# Patient Record
Sex: FEMALE | Race: WHITE | NOT HISPANIC OR LATINO | Employment: FULL TIME | URBAN - NONMETROPOLITAN AREA
[De-identification: names, ages, dates, MRNs, and addresses within clinical notes are randomized per-mention and may not be internally consistent; named-entity substitution may affect disease eponyms.]

---

## 2023-08-29 PROBLEM — N20.0 LEFT NEPHROLITHIASIS: Status: ACTIVE | Noted: 2023-08-29

## 2023-08-29 PROBLEM — R32 URINARY INCONTINENCE: Status: ACTIVE | Noted: 2023-08-29

## 2023-08-29 RX ORDER — OXYBUTYNIN CHLORIDE 10 MG/1
1 TABLET, EXTENDED RELEASE ORAL DAILY
COMMUNITY
Start: 2019-11-05 | End: 2023-09-18 | Stop reason: SDUPTHER

## 2023-08-30 ENCOUNTER — OFFICE VISIT (OUTPATIENT)
Dept: PRIMARY CARE | Facility: CLINIC | Age: 63
End: 2023-08-30
Payer: COMMERCIAL

## 2023-08-30 ENCOUNTER — LAB (OUTPATIENT)
Dept: LAB | Facility: LAB | Age: 63
End: 2023-08-30
Payer: COMMERCIAL

## 2023-08-30 VITALS — HEART RATE: 81 BPM | OXYGEN SATURATION: 97 % | BODY MASS INDEX: 20.74 KG/M2 | WEIGHT: 124.5 LBS | HEIGHT: 65 IN

## 2023-08-30 DIAGNOSIS — Z00.00 WELLNESS EXAMINATION: ICD-10-CM

## 2023-08-30 DIAGNOSIS — F41.9 ANXIETY: Primary | ICD-10-CM

## 2023-08-30 LAB
ALANINE AMINOTRANSFERASE (SGPT) (U/L) IN SER/PLAS: 14 U/L (ref 7–45)
ALBUMIN (G/DL) IN SER/PLAS: 4.4 G/DL (ref 3.4–5)
ALKALINE PHOSPHATASE (U/L) IN SER/PLAS: 79 U/L (ref 33–136)
ANION GAP IN SER/PLAS: 10 MMOL/L (ref 10–20)
ASPARTATE AMINOTRANSFERASE (SGOT) (U/L) IN SER/PLAS: 15 U/L (ref 9–39)
BILIRUBIN TOTAL (MG/DL) IN SER/PLAS: 0.6 MG/DL (ref 0–1.2)
CALCIUM (MG/DL) IN SER/PLAS: 10.1 MG/DL (ref 8.6–10.3)
CARBON DIOXIDE, TOTAL (MMOL/L) IN SER/PLAS: 30 MMOL/L (ref 21–32)
CHLORIDE (MMOL/L) IN SER/PLAS: 105 MMOL/L (ref 98–107)
CHOLESTEROL (MG/DL) IN SER/PLAS: 201 MG/DL (ref 0–199)
CHOLESTEROL IN HDL (MG/DL) IN SER/PLAS: 66 MG/DL
CHOLESTEROL/HDL RATIO: 3
CREATININE (MG/DL) IN SER/PLAS: 0.8 MG/DL (ref 0.5–1.05)
ERYTHROCYTE DISTRIBUTION WIDTH (RATIO) BY AUTOMATED COUNT: 14.1 % (ref 11.5–14.5)
ERYTHROCYTE MEAN CORPUSCULAR HEMOGLOBIN CONCENTRATION (G/DL) BY AUTOMATED: 31.3 G/DL (ref 32–36)
ERYTHROCYTE MEAN CORPUSCULAR VOLUME (FL) BY AUTOMATED COUNT: 88 FL (ref 80–100)
ERYTHROCYTES (10*6/UL) IN BLOOD BY AUTOMATED COUNT: 5.22 X10E12/L (ref 4–5.2)
GFR FEMALE: 84 ML/MIN/1.73M2
GLUCOSE (MG/DL) IN SER/PLAS: 91 MG/DL (ref 74–99)
HEMATOCRIT (%) IN BLOOD BY AUTOMATED COUNT: 46 % (ref 36–46)
HEMOGLOBIN (G/DL) IN BLOOD: 14.4 G/DL (ref 12–16)
LDL: 118 MG/DL (ref 0–99)
LEUKOCYTES (10*3/UL) IN BLOOD BY AUTOMATED COUNT: 6.5 X10E9/L (ref 4.4–11.3)
PLATELETS (10*3/UL) IN BLOOD AUTOMATED COUNT: 267 X10E9/L (ref 150–450)
POTASSIUM (MMOL/L) IN SER/PLAS: 4.3 MMOL/L (ref 3.5–5.3)
PROTEIN TOTAL: 6.6 G/DL (ref 6.4–8.2)
SODIUM (MMOL/L) IN SER/PLAS: 141 MMOL/L (ref 136–145)
THYROTROPIN (MIU/L) IN SER/PLAS BY DETECTION LIMIT <= 0.05 MIU/L: 1.27 MIU/L (ref 0.44–3.98)
TRIGLYCERIDE (MG/DL) IN SER/PLAS: 83 MG/DL (ref 0–149)
UREA NITROGEN (MG/DL) IN SER/PLAS: 20 MG/DL (ref 6–23)
VLDL: 17 MG/DL (ref 0–40)

## 2023-08-30 PROCEDURE — 84443 ASSAY THYROID STIM HORMONE: CPT

## 2023-08-30 PROCEDURE — 85027 COMPLETE CBC AUTOMATED: CPT

## 2023-08-30 PROCEDURE — 36415 COLL VENOUS BLD VENIPUNCTURE: CPT

## 2023-08-30 PROCEDURE — 99213 OFFICE O/P EST LOW 20 MIN: CPT | Performed by: FAMILY MEDICINE

## 2023-08-30 PROCEDURE — 80061 LIPID PANEL: CPT

## 2023-08-30 PROCEDURE — 80053 COMPREHEN METABOLIC PANEL: CPT

## 2023-08-30 PROCEDURE — 1036F TOBACCO NON-USER: CPT | Performed by: FAMILY MEDICINE

## 2023-08-30 RX ORDER — MELOXICAM 7.5 MG/1
7.5 TABLET ORAL DAILY
COMMUNITY
Start: 2023-04-06 | End: 2023-10-13 | Stop reason: ENTERED-IN-ERROR

## 2023-08-30 NOTE — PROGRESS NOTES
"Subjective   Patient ID: Jolynn Pena is a 63 y.o. female who presents for med check.    HPI here today for a wellness exam laboratory assessment as insurance will be changing.  She reports that she is leaving her job at the [employer] because of the stress.  Since she has made the decision her level of anxiety and dysphoric mood has improved to where she does not feel counseling or medication are necessary, but is to say there is a light at the end of the tunnel.  However there is some concerns over the effect on income to the family.  I reviewed that she is having crying melancholy increased sleep latency early morning awakening trouble with concentration thought rumination.  He is alone meet criterion for diagnosis.  She does not have any suicidal or homicidal ideation, no OCD.    Review of Systems    Objective   Pulse 81   Ht 1.651 m (5' 5\")   Wt 56.5 kg (124 lb 8 oz)   SpO2 97%   BMI 20.72 kg/m²     Physical Exam  Thoughts are appropriate, she has good insight.  Voice has good modulation with appropriate response to questioning.  Assessment/Plan   Problem List Items Addressed This Visit       Anxiety - Primary     Other Visit Diagnoses       Wellness examination        Relevant Orders    CBC (Completed)    Comprehensive Metabolic Panel (Completed)    Lipid Panel (Completed)    Thyroid Stimulating Hormone (Completed)          Follow-up as needed during transition to halfway.     "

## 2023-09-18 DIAGNOSIS — N39.46 MIXED STRESS AND URGE URINARY INCONTINENCE: Primary | ICD-10-CM

## 2023-09-18 RX ORDER — OXYBUTYNIN CHLORIDE 10 MG/1
10 TABLET, EXTENDED RELEASE ORAL DAILY
Qty: 90 TABLET | Refills: 3 | Status: SHIPPED | OUTPATIENT
Start: 2023-09-18 | End: 2024-09-17

## 2023-10-10 ENCOUNTER — APPOINTMENT (OUTPATIENT)
Dept: RADIOLOGY | Facility: HOSPITAL | Age: 63
End: 2023-10-10
Payer: COMMERCIAL

## 2023-10-10 ENCOUNTER — HOSPITAL ENCOUNTER (EMERGENCY)
Facility: HOSPITAL | Age: 63
Discharge: OTHER INSTITUTION | End: 2023-10-11
Attending: EMERGENCY MEDICINE
Payer: COMMERCIAL

## 2023-10-10 DIAGNOSIS — R41.82 ALTERED MENTAL STATUS, UNSPECIFIED ALTERED MENTAL STATUS TYPE: ICD-10-CM

## 2023-10-10 DIAGNOSIS — G93.89 MASS OF BRAIN: ICD-10-CM

## 2023-10-10 DIAGNOSIS — R41.3 AMNESIA: ICD-10-CM

## 2023-10-10 DIAGNOSIS — R11.0 NAUSEA: Primary | ICD-10-CM

## 2023-10-10 LAB
ALBUMIN SERPL BCP-MCNC: 4.2 G/DL (ref 3.4–5)
ALP SERPL-CCNC: 69 U/L (ref 33–136)
ALT SERPL W P-5'-P-CCNC: 16 U/L (ref 7–45)
ANION GAP SERPL CALC-SCNC: 11 MMOL/L (ref 10–20)
AST SERPL W P-5'-P-CCNC: 18 U/L (ref 9–39)
BASOPHILS # BLD AUTO: 0.04 X10*3/UL (ref 0–0.1)
BASOPHILS NFR BLD AUTO: 0.7 %
BILIRUB SERPL-MCNC: 0.3 MG/DL (ref 0–1.2)
BUN SERPL-MCNC: 17 MG/DL (ref 6–23)
CALCIUM SERPL-MCNC: 9.3 MG/DL (ref 8.6–10.3)
CARDIAC TROPONIN I PNL SERPL HS: <3 NG/L (ref 0–13)
CHLORIDE SERPL-SCNC: 105 MMOL/L (ref 98–107)
CO2 SERPL-SCNC: 27 MMOL/L (ref 21–32)
CREAT SERPL-MCNC: 0.71 MG/DL (ref 0.5–1.05)
EOSINOPHIL # BLD AUTO: 0.05 X10*3/UL (ref 0–0.7)
EOSINOPHIL NFR BLD AUTO: 0.9 %
ERYTHROCYTE [DISTWIDTH] IN BLOOD BY AUTOMATED COUNT: 13.7 % (ref 11.5–14.5)
ETHANOL SERPL-MCNC: 105 MG/DL
GFR SERPL CREATININE-BSD FRML MDRD: >90 ML/MIN/1.73M*2
GLUCOSE SERPL-MCNC: 96 MG/DL (ref 74–99)
HCT VFR BLD AUTO: 39.2 % (ref 36–46)
HGB BLD-MCNC: 12.4 G/DL (ref 12–16)
IMM GRANULOCYTES # BLD AUTO: 0.07 X10*3/UL (ref 0–0.7)
IMM GRANULOCYTES NFR BLD AUTO: 1.2 % (ref 0–0.9)
LYMPHOCYTES # BLD AUTO: 1.33 X10*3/UL (ref 1.2–4.8)
LYMPHOCYTES NFR BLD AUTO: 23.5 %
MAGNESIUM SERPL-MCNC: 1.95 MG/DL (ref 1.6–2.4)
MCH RBC QN AUTO: 27.5 PG (ref 26–34)
MCHC RBC AUTO-ENTMCNC: 31.6 G/DL (ref 32–36)
MCV RBC AUTO: 87 FL (ref 80–100)
MONOCYTES # BLD AUTO: 0.61 X10*3/UL (ref 0.1–1)
MONOCYTES NFR BLD AUTO: 10.8 %
NEUTROPHILS # BLD AUTO: 3.55 X10*3/UL (ref 1.2–7.7)
NEUTROPHILS NFR BLD AUTO: 62.9 %
NRBC BLD-RTO: 0 /100 WBCS (ref 0–0)
PLATELET # BLD AUTO: 247 X10*3/UL (ref 150–450)
PMV BLD AUTO: 11.4 FL (ref 7.5–11.5)
POTASSIUM SERPL-SCNC: 3.6 MMOL/L (ref 3.5–5.3)
PROT SERPL-MCNC: 6.6 G/DL (ref 6.4–8.2)
RBC # BLD AUTO: 4.51 X10*6/UL (ref 4–5.2)
SODIUM SERPL-SCNC: 139 MMOL/L (ref 136–145)
WBC # BLD AUTO: 5.7 X10*3/UL (ref 4.4–11.3)

## 2023-10-10 PROCEDURE — 82077 ASSAY SPEC XCP UR&BREATH IA: CPT | Performed by: PHYSICIAN ASSISTANT

## 2023-10-10 PROCEDURE — 80053 COMPREHEN METABOLIC PANEL: CPT | Performed by: PHYSICIAN ASSISTANT

## 2023-10-10 PROCEDURE — 2500000004 HC RX 250 GENERAL PHARMACY W/ HCPCS (ALT 636 FOR OP/ED): Performed by: PHYSICIAN ASSISTANT

## 2023-10-10 PROCEDURE — 85610 PROTHROMBIN TIME: CPT | Performed by: PHYSICIAN ASSISTANT

## 2023-10-10 PROCEDURE — 70450 CT HEAD/BRAIN W/O DYE: CPT | Performed by: STUDENT IN AN ORGANIZED HEALTH CARE EDUCATION/TRAINING PROGRAM

## 2023-10-10 PROCEDURE — 70450 CT HEAD/BRAIN W/O DYE: CPT | Mod: 59

## 2023-10-10 PROCEDURE — 36415 COLL VENOUS BLD VENIPUNCTURE: CPT | Performed by: PHYSICIAN ASSISTANT

## 2023-10-10 PROCEDURE — 87186 SC STD MICRODIL/AGAR DIL: CPT | Mod: CMCLAB,SAMLAB | Performed by: PHYSICIAN ASSISTANT

## 2023-10-10 PROCEDURE — 87086 URINE CULTURE/COLONY COUNT: CPT | Mod: SAMLAB | Performed by: PHYSICIAN ASSISTANT

## 2023-10-10 PROCEDURE — 96374 THER/PROPH/DIAG INJ IV PUSH: CPT | Mod: XU

## 2023-10-10 PROCEDURE — 70498 CT ANGIOGRAPHY NECK: CPT | Performed by: STUDENT IN AN ORGANIZED HEALTH CARE EDUCATION/TRAINING PROGRAM

## 2023-10-10 PROCEDURE — 71045 X-RAY EXAM CHEST 1 VIEW: CPT | Mod: FOREIGN READ | Performed by: RADIOLOGY

## 2023-10-10 PROCEDURE — 85025 COMPLETE CBC W/AUTO DIFF WBC: CPT | Performed by: PHYSICIAN ASSISTANT

## 2023-10-10 PROCEDURE — 2550000001 HC RX 255 CONTRASTS: Performed by: EMERGENCY MEDICINE

## 2023-10-10 PROCEDURE — 85730 THROMBOPLASTIN TIME PARTIAL: CPT | Performed by: PHYSICIAN ASSISTANT

## 2023-10-10 PROCEDURE — 70496 CT ANGIOGRAPHY HEAD: CPT

## 2023-10-10 PROCEDURE — 70496 CT ANGIOGRAPHY HEAD: CPT | Performed by: STUDENT IN AN ORGANIZED HEALTH CARE EDUCATION/TRAINING PROGRAM

## 2023-10-10 PROCEDURE — 81001 URINALYSIS AUTO W/SCOPE: CPT | Performed by: PHYSICIAN ASSISTANT

## 2023-10-10 PROCEDURE — 93005 ELECTROCARDIOGRAM TRACING: CPT

## 2023-10-10 PROCEDURE — 71045 X-RAY EXAM CHEST 1 VIEW: CPT | Mod: FR

## 2023-10-10 PROCEDURE — 99285 EMERGENCY DEPT VISIT HI MDM: CPT | Mod: 25

## 2023-10-10 PROCEDURE — 70498 CT ANGIOGRAPHY NECK: CPT

## 2023-10-10 PROCEDURE — 99284 EMERGENCY DEPT VISIT MOD MDM: CPT | Mod: 25

## 2023-10-10 PROCEDURE — 83735 ASSAY OF MAGNESIUM: CPT | Performed by: PHYSICIAN ASSISTANT

## 2023-10-10 PROCEDURE — 84484 ASSAY OF TROPONIN QUANT: CPT | Performed by: PHYSICIAN ASSISTANT

## 2023-10-10 RX ORDER — LORAZEPAM 2 MG/ML
1 INJECTION INTRAMUSCULAR ONCE
Status: COMPLETED | OUTPATIENT
Start: 2023-10-10 | End: 2023-10-10

## 2023-10-10 RX ADMIN — LORAZEPAM 1 MG: 2 INJECTION INTRAMUSCULAR; INTRAVENOUS at 19:17

## 2023-10-10 RX ADMIN — IOHEXOL 85 ML: 350 INJECTION, SOLUTION INTRAVENOUS at 20:31

## 2023-10-10 ASSESSMENT — ENCOUNTER SYMPTOMS
PALPITATIONS: 0
SHORTNESS OF BREATH: 0
ARTHRALGIAS: 0
HEMATURIA: 0
EYE PAIN: 0
BACK PAIN: 0
VOMITING: 0
CHILLS: 0
NERVOUS/ANXIOUS: 1
COLOR CHANGE: 0
ABDOMINAL PAIN: 0
FEVER: 0
SEIZURES: 0
DYSURIA: 0
SORE THROAT: 0
COUGH: 0

## 2023-10-10 ASSESSMENT — COLUMBIA-SUICIDE SEVERITY RATING SCALE - C-SSRS
6. HAVE YOU EVER DONE ANYTHING, STARTED TO DO ANYTHING, OR PREPARED TO DO ANYTHING TO END YOUR LIFE?: NO
1. IN THE PAST MONTH, HAVE YOU WISHED YOU WERE DEAD OR WISHED YOU COULD GO TO SLEEP AND NOT WAKE UP?: NO
2. HAVE YOU ACTUALLY HAD ANY THOUGHTS OF KILLING YOURSELF?: NO

## 2023-10-10 ASSESSMENT — PAIN - FUNCTIONAL ASSESSMENT: PAIN_FUNCTIONAL_ASSESSMENT: 0-10

## 2023-10-10 NOTE — ED PROVIDER NOTES
Patient is a 63-year-old female who presents to the emergency department with a chief complaint of nausea and feeling anxious.  Patient reports that today was the last day of her job.  She is retiring.  She states that she was feeling anxious prior to leading up to this.  She states that she saw her family physician who recently prescribed her hydroxyzine.  She took a dose on Saturday and also took a dose today.  Patient reports that after work she went out with some coworkers to have an alcoholic beverage.  She reports that after having a sip of the alcohol she began feeling odd.  It was reported by the patient's coworkers that she started breathing fast, could not stand up, could not speak.  She was not sure if she was having a reaction to her medication.  Patient does report that she feels slightly anxious.  She is tearful.      History provided by:  Relative       Review of Systems   Constitutional:  Negative for chills and fever.   HENT:  Negative for ear pain and sore throat.    Eyes:  Negative for pain and visual disturbance.   Respiratory:  Negative for cough and shortness of breath.    Cardiovascular:  Negative for chest pain and palpitations.   Gastrointestinal:  Negative for abdominal pain and vomiting.   Genitourinary:  Negative for dysuria and hematuria.   Musculoskeletal:  Negative for arthralgias and back pain.   Skin:  Negative for color change and rash.   Neurological:  Negative for seizures and syncope.   Psychiatric/Behavioral:  Positive for suicidal ideas. The patient is nervous/anxious.    All other systems reviewed and are negative.       Physical Exam  Vitals and nursing note reviewed.   Constitutional:       General: She is not in acute distress.     Appearance: She is well-developed.   HENT:      Head: Normocephalic and atraumatic.   Eyes:      Conjunctiva/sclera: Conjunctivae normal.   Cardiovascular:      Rate and Rhythm: Normal rate and regular rhythm.      Heart sounds: No murmur  heard.  Pulmonary:      Effort: Pulmonary effort is normal. No respiratory distress.      Breath sounds: Normal breath sounds.   Abdominal:      Palpations: Abdomen is soft.      Tenderness: There is no abdominal tenderness.   Musculoskeletal:         General: No swelling.      Cervical back: Neck supple.   Skin:     General: Skin is warm and dry.      Capillary Refill: Capillary refill takes less than 2 seconds.   Neurological:      General: No focal deficit present.      Mental Status: She is alert and oriented to person, place, and time.      Cranial Nerves: No cranial nerve deficit.      Sensory: No sensory deficit.      Motor: No weakness.      Coordination: Coordination normal.      Gait: Gait normal.   Psychiatric:         Mood and Affect: Mood is anxious.      Comments: Tearful          Labs Reviewed   CBC WITH AUTO DIFFERENTIAL - Abnormal       Result Value    WBC 5.7      nRBC 0.0      RBC 4.51      Hemoglobin 12.4      Hematocrit 39.2      MCV 87      MCH 27.5      MCHC 31.6 (*)     RDW 13.7      Platelets 247      MPV 11.4      Neutrophils % 62.9      Immature Granulocytes %, Automated 1.2 (*)     Lymphocytes % 23.5      Monocytes % 10.8      Eosinophils % 0.9      Basophils % 0.7      Neutrophils Absolute 3.55      Immature Granulocytes Absolute, Automated 0.07      Lymphocytes Absolute 1.33      Monocytes Absolute 0.61      Eosinophils Absolute 0.05      Basophils Absolute 0.04     PROTIME-INR - Abnormal    Protime 13.2 (*)     INR 1.2 (*)    URINALYSIS WITH REFLEX MICROSCOPIC AND CULTURE - Abnormal    Color, Urine Yellow      Appearance, Urine Clear      Specific Gravity, Urine 1.013      pH, Urine 6.0      Protein, Urine NEGATIVE      Glucose, Urine NEGATIVE      Blood, Urine NEGATIVE      Ketones, Urine NEGATIVE      Bilirubin, Urine NEGATIVE      Urobilinogen, Urine <2.0      Nitrite, Urine POSITIVE (*)     Leukocyte Esterase, Urine LARGE (3+) (*)    ALCOHOL - Abnormal    Alcohol 105 (*)     URINALYSIS MICROSCOPIC ONLY - Abnormal    WBC, Urine 6-10 (*)     RBC, Urine 1-2      Mucus, Urine 1+     COMPREHENSIVE METABOLIC PANEL - Normal    Glucose 96      Sodium 139      Potassium 3.6      Chloride 105      Bicarbonate 27      Anion Gap 11      Urea Nitrogen 17      Creatinine 0.71      eGFR >90      Calcium 9.3      Albumin 4.2      Alkaline Phosphatase 69      Total Protein 6.6      AST 18      Bilirubin, Total 0.3      ALT 16     MAGNESIUM - Normal    Magnesium 1.95     APTT - Normal    aPTT 29      Narrative:     The APTT is no longer used for monitoring Unfractionated Heparin Therapy. For monitoring Heparin Therapy, use the Heparin Assay.   SERIAL TROPONIN-INITIAL - Normal    Troponin I, High Sensitivity <3      Narrative:     Less than 99th percentile of normal range cutoff-  Female and children under 18 years old <14 ng/L; Male <21 ng/L: Negative  Repeat testing should be performed if clinically indicated.     Female and children under 18 years old 14-50 ng/L; Male 21-50 ng/L:  Consistent with possible cardiac damage and possible increased clinical   risk. Serial measurements may help to assess extent of myocardial damage.     >50 ng/L: Consistent with cardiac damage, increased clinical risk and  myocardial infarction. Serial measurements may help assess extent of   myocardial damage.      NOTE: Children less than 1 year old may have higher baseline troponin   levels and results should be interpreted in conjunction with the overall   clinical context.     NOTE: Troponin I testing is performed using a different   testing methodology at [facility] than at other   [facility] hospitals. Direct result comparisons should only   be made within the same method.   URINE CULTURE   EXTRA URINE GRAY TUBE    Extra Tube Hold for add-ons.     TROPONIN SERIES- (INITIAL, 1 HR)    Narrative:     The following orders were created for panel order Troponin I Series, High Sensitivity (0, 1 HR).  Procedure                                Abnormality         Status                     ---------                               -----------         ------                     Troponin I, High Sensiti...[[id number]]  Normal              Final result               Troponin, High Sensitivi...[[id number]]                                                   Please view results for these tests on the individual orders.   URINALYSIS WITH REFLEX MICROSCOPIC AND CULTURE    Narrative:     The following orders were created for panel order Urinalysis with Reflex Microscopic and Culture.  Procedure                               Abnormality         Status                     ---------                               -----------         ------                     Urinalysis with Reflex M...[[id number]]  Abnormal            Final result               Extra Urine Gray Tube[[id number]]                            Preliminary result           Please view results for these tests on the individual orders.   SERIAL TROPONIN, 1 HOUR        CT angio brain attack neck w IV contrast and post procedure   Final Result   No evidence for significant stenosis or large branch vessel cutoffs   of the intracranial or cervical vessels. Tortuosity of the bilateral   cervical internal carotid and vertebral arteries without wall   irregularity is nonspecific and may represent mild fibromuscular   dysplasia. No evidence for aneurysm.        There is an enhancing presumed extra-axial mass within the right   anterior cranial fossa without apparent dural tail or hyperostosis.   This is favored to represent a benign meningioma, however   nonspecific. Further characterization with nonemergent contrast   enhanced brain MRI is recommended.        MACRO:   None        Signed by: Roderick Simmons 10/10/2023 9:38 PM   Dictation workstation:   [id number]      CT angio brain attack head w IV contrast and post procedure   Final Result   No evidence for significant stenosis or large branch  vessel cutoffs   of the intracranial or cervical vessels. Tortuosity of the bilateral   cervical internal carotid and vertebral arteries without wall   irregularity is nonspecific and may represent mild fibromuscular   dysplasia. No evidence for aneurysm.        There is an enhancing presumed extra-axial mass within the right   anterior cranial fossa without apparent dural tail or hyperostosis.   This is favored to represent a benign meningioma, however   nonspecific. Further characterization with nonemergent contrast   enhanced brain MRI is recommended.        MACRO:   None        Signed by: Roderick Simmons 10/10/2023 9:38 PM   Dictation workstation:   [id number]      CT head wo IV contrast   Final Result   1. 2.7 x 1.8 x 1.7 cm hyperdense mass in the region of the right   anterior perforated substance. There may be minimal surrounding   vasogenic edema though less than expected for an acute hematoma and   its density may reflect a subacute or late acute hematoma.   Differential diagnosis also includes a large aneurysm projecting   superiorly or less likely neoplasm. Recommend CT angiography of the   head at this time for further evaluation.                       MACRO:   Carloz Araujo discussed the significance and urgency of this   critical finding by telephone with  IRIS FREITAS on 10/10/2023   at 8:14 pm.  (**-RCF-**) Findings:  See findings.        Signed by: Carloz Araujo 10/10/2023 8:16 PM   Dictation workstation:   [id number]      XR chest 1 view   Final Result   No acute pulmonary disease   Signed by Tray Albarran MD           Procedures     Medical Decision Making  Patient is a 63-year-old female who presents to the emergency department with some vague complaints.  She originally presented with nausea and feeling off.  When I evaluated the patient she was extremely tearful reported that she felt extremely anxious.  She stated that she felt off but was unable to further elaborate.  Patient's  daughter did show up later after the patient was triaged and it was reported that patient had gone out for an alcoholic beverage with some coworkers to celebrate her MCC.  Patient reports that she did have a very small amount of alcohol.  The patient's daughter reports that it was reported that the patient at 1 point was acting inappropriately.  She reported she could not walk.  Her head was slumped.  She was breathing and fast and hyperventilating.  CT scan of the head was obtained which showed a mass which there was a concern for hematoma or any aneurysm.  Patient was immediately taken to CT angio of her head and neck, for further evaluation.  CT scan of the head and neck showed findings likely consistent with a benign meningioma with no evidence of significant stenosis or large branch vessel cutoffs.  Discussed in depth with the patient and her family throughout this entire course.  They requested transfer to [facility].  Patient was excepted by Dr. Weston to [facility] emergency department.  Transportation via squad is approximately 2 hours out.  Patient's NIH continues to remain a 0.  She was scored a 1 during second evaluation in which she had some mildly slurred speech which has resolved.  She did also have some initial gait abnormalities when she ambulated to the bathroom.  She still is having some difficulty ambulating and needs ambulating shin with assistance but it is significantly improved.  Patient is having issues with memory.  She seems to be very forgetful about recent events.  Patient remains hemodynamically stable.  Patient care will be transitioned to attending physician, Dr. Jordan Otero at 2333.    Amount and/or Complexity of Data Reviewed  External Data Reviewed: labs.  Labs: ordered. Decision-making details documented in ED Course.  Radiology: ordered. Decision-making details documented in ED Course.  ECG/medicine tests: ordered and independent interpretation performed.      Details: EKG shows normal sinus rhythm with a rate of 86 bpm.  No ST elevation.  Peer intervals 132 ms and QRS duration is 90 ms.  EKG interpretation per myself, Marleny Arango    Risk  Decision regarding hospitalization.    The patient was seen by the CORBIN.  I have personally performed a substantive portion of the encounter.  I have seen and examined the patient; agree with the workup, evaluation, MDM, management and diagnosis.  The care plan has been discussed with the CORBIN.  I have reviewed the CORBIN's note and agree with the documented findings.  Jordan Clark DO     ED Course as of 10/11/23 0351   Tue Oct 10, 2023   1949 CT head wo IV contrast [KM]   1950 Reviewed CT scan of the head, awaiting image read. There is concern for either a brain bleed or brain mass [KM]   2009 Called Rad ops. Asked about status of CT scan of the brain. Jaycob from Rad ops said no radiologist has picked up the case-he will send it out again. [KM]   2017 I spoke with radiologist who states that there is a hyperattenuated mass and a CT angio is recommended for further assesment [KM]   2140 I spoke with [facility]. Updated them on CTA results. Results faxed to the transfer center. Family updated [KM]   2224 Patient accepted to [facility] ED. Accepted by Dr. Weston [KM]      ED Course User Index  [KM] Marleny Arango PA-C         Diagnoses as of 10/11/23 0351   Nausea   Amnesia   Mass of brain   Altered mental status, unspecified altered mental status type                    Marleny Arango PA-C  10/10/23 9993       Jordan Clark DO  10/11/23 0351

## 2023-10-11 VITALS
DIASTOLIC BLOOD PRESSURE: 79 MMHG | RESPIRATION RATE: 18 BRPM | WEIGHT: 125 LBS | TEMPERATURE: 97.8 F | HEART RATE: 79 BPM | SYSTOLIC BLOOD PRESSURE: 117 MMHG | BODY MASS INDEX: 21.34 KG/M2 | OXYGEN SATURATION: 98 % | HEIGHT: 64 IN

## 2023-10-11 LAB
APPEARANCE UR: CLEAR
APTT PPP: 29 SECONDS (ref 27–38)
BILIRUB UR STRIP.AUTO-MCNC: NEGATIVE MG/DL
COLOR UR: YELLOW
GLUCOSE UR STRIP.AUTO-MCNC: NEGATIVE MG/DL
INR PPP: 1.2 (ref 0.9–1.1)
KETONES UR STRIP.AUTO-MCNC: NEGATIVE MG/DL
LEUKOCYTE ESTERASE UR QL STRIP.AUTO: ABNORMAL
MUCOUS THREADS #/AREA URNS AUTO: ABNORMAL /LPF
NITRITE UR QL STRIP.AUTO: POSITIVE
PH UR STRIP.AUTO: 6 [PH]
PROT UR STRIP.AUTO-MCNC: NEGATIVE MG/DL
PROTHROMBIN TIME: 13.2 SECONDS (ref 9.8–12.8)
RBC # UR STRIP.AUTO: NEGATIVE /UL
RBC #/AREA URNS AUTO: ABNORMAL /HPF
SP GR UR STRIP.AUTO: 1.01
UROBILINOGEN UR STRIP.AUTO-MCNC: <2 MG/DL
WBC #/AREA URNS AUTO: ABNORMAL /HPF

## 2023-10-12 ENCOUNTER — TELEPHONE (OUTPATIENT)
Dept: PRIMARY CARE | Facility: CLINIC | Age: 63
End: 2023-10-12
Payer: COMMERCIAL

## 2023-10-12 DIAGNOSIS — Z71.89 BRAIN TUMOR CONSULTATION: ICD-10-CM

## 2023-10-12 NOTE — TELEPHONE ENCOUNTER
TOLD TO CALL FOR A REFERRAL TO  DR TRESA PARKER   NEUROLOGIST. [facility] . BRAIN TUMOR   [phone]   TY  DAUGHTER    REFERRAL  ENTERED

## 2023-10-13 ENCOUNTER — HOSPITAL ENCOUNTER (EMERGENCY)
Facility: HOSPITAL | Age: 63
Discharge: HOME | End: 2023-10-13
Attending: EMERGENCY MEDICINE
Payer: COMMERCIAL

## 2023-10-13 ENCOUNTER — APPOINTMENT (OUTPATIENT)
Dept: RADIOLOGY | Facility: HOSPITAL | Age: 63
End: 2023-10-13
Payer: COMMERCIAL

## 2023-10-13 ENCOUNTER — HOSPITAL ENCOUNTER (EMERGENCY)
Facility: HOSPITAL | Age: 63
Discharge: STILL ADMITTED OR ADMINISTRATIVE | End: 2023-10-13
Attending: EMERGENCY MEDICINE
Payer: COMMERCIAL

## 2023-10-13 VITALS
HEIGHT: 65 IN | SYSTOLIC BLOOD PRESSURE: 115 MMHG | WEIGHT: 125 LBS | DIASTOLIC BLOOD PRESSURE: 76 MMHG | TEMPERATURE: 97.1 F | RESPIRATION RATE: 17 BRPM | BODY MASS INDEX: 20.83 KG/M2 | HEART RATE: 83 BPM | OXYGEN SATURATION: 100 %

## 2023-10-13 DIAGNOSIS — R20.2 LEFT LEG PARESTHESIAS: Primary | ICD-10-CM

## 2023-10-13 DIAGNOSIS — D32.9 MENINGIOMA (MULTI): Primary | ICD-10-CM

## 2023-10-13 LAB
ANION GAP SERPL CALC-SCNC: 10 MMOL/L (ref 10–20)
BACTERIA UR CULT: ABNORMAL
BASOPHILS # BLD AUTO: 0.05 X10*3/UL (ref 0–0.1)
BASOPHILS NFR BLD AUTO: 0.8 %
BUN SERPL-MCNC: 21 MG/DL (ref 6–23)
CALCIUM SERPL-MCNC: 9.4 MG/DL (ref 8.6–10.3)
CARDIAC TROPONIN I PNL SERPL HS: 3 NG/L (ref 0–13)
CARDIAC TROPONIN I PNL SERPL HS: <3 NG/L (ref 0–13)
CHLORIDE SERPL-SCNC: 106 MMOL/L (ref 98–107)
CO2 SERPL-SCNC: 27 MMOL/L (ref 21–32)
CREAT SERPL-MCNC: 0.71 MG/DL (ref 0.5–1.05)
EOSINOPHIL # BLD AUTO: 0.11 X10*3/UL (ref 0–0.7)
EOSINOPHIL NFR BLD AUTO: 1.7 %
ERYTHROCYTE [DISTWIDTH] IN BLOOD BY AUTOMATED COUNT: 13.8 % (ref 11.5–14.5)
GFR SERPL CREATININE-BSD FRML MDRD: >90 ML/MIN/1.73M*2
GLUCOSE SERPL-MCNC: 89 MG/DL (ref 74–99)
HCT VFR BLD AUTO: 42.2 % (ref 36–46)
HGB BLD-MCNC: 13.5 G/DL (ref 12–16)
IMM GRANULOCYTES # BLD AUTO: 0.02 X10*3/UL (ref 0–0.7)
IMM GRANULOCYTES NFR BLD AUTO: 0.3 % (ref 0–0.9)
LYMPHOCYTES # BLD AUTO: 1.42 X10*3/UL (ref 1.2–4.8)
LYMPHOCYTES NFR BLD AUTO: 22.5 %
MCH RBC QN AUTO: 27.8 PG (ref 26–34)
MCHC RBC AUTO-ENTMCNC: 32 G/DL (ref 32–36)
MCV RBC AUTO: 87 FL (ref 80–100)
MONOCYTES # BLD AUTO: 0.67 X10*3/UL (ref 0.1–1)
MONOCYTES NFR BLD AUTO: 10.6 %
NEUTROPHILS # BLD AUTO: 4.05 X10*3/UL (ref 1.2–7.7)
NEUTROPHILS NFR BLD AUTO: 64.1 %
NRBC BLD-RTO: 0 /100 WBCS (ref 0–0)
PLATELET # BLD AUTO: 267 X10*3/UL (ref 150–450)
PMV BLD AUTO: 10.8 FL (ref 7.5–11.5)
POTASSIUM SERPL-SCNC: 4.2 MMOL/L (ref 3.5–5.3)
RBC # BLD AUTO: 4.86 X10*6/UL (ref 4–5.2)
SODIUM SERPL-SCNC: 139 MMOL/L (ref 136–145)
WBC # BLD AUTO: 6.3 X10*3/UL (ref 4.4–11.3)

## 2023-10-13 PROCEDURE — 99281 EMR DPT VST MAYX REQ PHY/QHP: CPT | Performed by: EMERGENCY MEDICINE

## 2023-10-13 PROCEDURE — 36415 COLL VENOUS BLD VENIPUNCTURE: CPT | Performed by: EMERGENCY MEDICINE

## 2023-10-13 PROCEDURE — 85025 COMPLETE CBC W/AUTO DIFF WBC: CPT | Performed by: EMERGENCY MEDICINE

## 2023-10-13 PROCEDURE — 70450 CT HEAD/BRAIN W/O DYE: CPT

## 2023-10-13 PROCEDURE — 84484 ASSAY OF TROPONIN QUANT: CPT | Performed by: EMERGENCY MEDICINE

## 2023-10-13 PROCEDURE — 4500999001 HC ED NO CHARGE

## 2023-10-13 PROCEDURE — 2500000004 HC RX 250 GENERAL PHARMACY W/ HCPCS (ALT 636 FOR OP/ED): Performed by: EMERGENCY MEDICINE

## 2023-10-13 PROCEDURE — 96374 THER/PROPH/DIAG INJ IV PUSH: CPT

## 2023-10-13 PROCEDURE — 93005 ELECTROCARDIOGRAM TRACING: CPT

## 2023-10-13 PROCEDURE — 80048 BASIC METABOLIC PNL TOTAL CA: CPT | Performed by: EMERGENCY MEDICINE

## 2023-10-13 PROCEDURE — 71046 X-RAY EXAM CHEST 2 VIEWS: CPT

## 2023-10-13 PROCEDURE — 99284 EMERGENCY DEPT VISIT MOD MDM: CPT | Mod: 25

## 2023-10-13 PROCEDURE — 71046 X-RAY EXAM CHEST 2 VIEWS: CPT | Performed by: RADIOLOGY

## 2023-10-13 PROCEDURE — 70450 CT HEAD/BRAIN W/O DYE: CPT | Performed by: RADIOLOGY

## 2023-10-13 RX ORDER — LORAZEPAM 2 MG/ML
0.5 INJECTION INTRAMUSCULAR ONCE
Status: COMPLETED | OUTPATIENT
Start: 2023-10-13 | End: 2023-10-13

## 2023-10-13 RX ORDER — HYDROXYZINE PAMOATE 25 MG/1
1 CAPSULE ORAL 4 TIMES DAILY PRN
COMMUNITY
Start: 2023-10-07 | End: 2024-04-23 | Stop reason: ALTCHOICE

## 2023-10-13 RX ORDER — MELOXICAM 15 MG/1
15 TABLET ORAL DAILY
COMMUNITY

## 2023-10-13 RX ADMIN — LORAZEPAM 0.5 MG: 2 INJECTION INTRAMUSCULAR; INTRAVENOUS at 14:43

## 2023-10-13 ASSESSMENT — PAIN - FUNCTIONAL ASSESSMENT
PAIN_FUNCTIONAL_ASSESSMENT: 0-10
PAIN_FUNCTIONAL_ASSESSMENT: 0-10

## 2023-10-13 ASSESSMENT — PAIN DESCRIPTION - LOCATION
LOCATION: EYE
LOCATION: EYE

## 2023-10-13 ASSESSMENT — COLUMBIA-SUICIDE SEVERITY RATING SCALE - C-SSRS
2. HAVE YOU ACTUALLY HAD ANY THOUGHTS OF KILLING YOURSELF?: NO
6. HAVE YOU EVER DONE ANYTHING, STARTED TO DO ANYTHING, OR PREPARED TO DO ANYTHING TO END YOUR LIFE?: NO
1. IN THE PAST MONTH, HAVE YOU WISHED YOU WERE DEAD OR WISHED YOU COULD GO TO SLEEP AND NOT WAKE UP?: NO

## 2023-10-13 ASSESSMENT — PAIN SCALES - GENERAL
PAINLEVEL_OUTOF10: 2
PAINLEVEL_OUTOF10: 2

## 2023-10-13 ASSESSMENT — PAIN DESCRIPTION - ORIENTATION
ORIENTATION: LEFT
ORIENTATION: LEFT

## 2023-10-13 ASSESSMENT — PAIN DESCRIPTION - PAIN TYPE
TYPE: ACUTE PAIN
TYPE: ACUTE PAIN

## 2023-10-13 NOTE — ED PROVIDER NOTES
"HPI   Chief Complaint   Patient presents with   • Extremity Weakness     Patient states she was seen here two days ago for slurred speech and altered mental, was found to have a tumor in her brain and was transferred to [place]. Daughter states today around 1:30 patient complained of left eye pain, they felt she had a left facial droop and patient states her left foot felt numb, symptoms seem to have all resolved. Dr Ferrer at bedside and does not feel a stroke alert should be called.       Patient presents to the emergency department secondary to concern for left-sided facial droop and tingling in her left foot.  The patient upon further history was recently diagnosed with a meningioma and was initially treated at our facility and then transferred to [facility] in [place].  She was subsequently discharged home and has scheduled an appointment with a [facility] neurologist for a \"second opinion\".  Today while having lunch with her friend apparently she had some tingling in her left foot and stated that she had some tingling around her left eye and questionable left facial droop.  Presents now to be further evaluated.      History provided by:  Patient and relative   used: No                        Ubaldo Coma Scale Score: 15         NIH Stroke Scale: 0          Patient History   Past Medical History:   Diagnosis Date   • History of meningioma      Past Surgical History:   Procedure Laterality Date   • CARPAL TUNNEL RELEASE Left      No family history on file.  Social History     Tobacco Use   • Smoking status: Never   • Smokeless tobacco: Never   Vaping Use   • Vaping Use: Never used   Substance Use Topics   • Alcohol use: Yes     Comment: occasional   • Drug use: Defer       Physical Exam   ED Triage Vitals [10/13/23 1400]   Temp Heart Rate Resp BP   36.2 °C (97.1 °F) 93 17 (!) 124/91      SpO2 Temp Source Heart Rate Source Patient Position   98 % Temporal Monitor --    "   BP Location FiO2 (%)     -- --       Physical Exam  Vitals and nursing note reviewed.   Constitutional:       General: She is not in acute distress.     Appearance: Normal appearance. She is normal weight. She is not ill-appearing, toxic-appearing or diaphoretic.      Comments: Patient is sitting up in bed tearful and crying.  She is not confused and she provides a full history.  She is not dysarthric.  There is no obvious facial droop while standing at the bedside.   HENT:      Head: Normocephalic and atraumatic.      Nose: Nose normal. No rhinorrhea.   Eyes:      Extraocular Movements: Extraocular movements intact.      Conjunctiva/sclera: Conjunctivae normal.      Pupils: Pupils are equal, round, and reactive to light.   Neck:      Comments: Trachea is midline  Cardiovascular:      Rate and Rhythm: Normal rate and regular rhythm.      Heart sounds: No murmur heard.  Pulmonary:      Effort: Pulmonary effort is normal.      Breath sounds: Normal breath sounds. No wheezing.   Abdominal:      General: Abdomen is flat. Bowel sounds are normal. There is no distension.      Palpations: Abdomen is soft.      Tenderness: There is no abdominal tenderness.   Musculoskeletal:         General: No swelling, tenderness, deformity or signs of injury. Normal range of motion.      Cervical back: Normal range of motion.      Right lower leg: No edema.      Left lower leg: No edema.      Comments: Patient has 5/5 muscle strength testing in all 4 extremities.   strengths are equal.  Patient can dorsiflex and plantarflex equally without deficit.   Skin:     General: Skin is warm and dry.      Findings: No rash.   Neurological:      General: No focal deficit present.      Mental Status: She is alert and oriented to person, place, and time. Mental status is at baseline.      Cranial Nerves: No cranial nerve deficit.      Sensory: No sensory deficit.      Motor: No weakness.      Coordination: Coordination normal.      Deep Tendon  Reflexes: Reflexes normal.      Comments: Cranial nerves II through XII are formally tested by myself and noted to be grossly intact.  Patient is full sensation over the bilateral upper and lower extremity dermatomes.  Specifically there is no evidence of facial droop.   Psychiatric:         Thought Content: Thought content normal.         Judgment: Judgment normal.      Comments: Anxious, crying, and tearful.  Able to be calm down at the bedside.         ED Course & MDM   Diagnoses as of 10/13/23 1837   Meningioma (CMS/Formerly [facility])       Medical Decision Making  Patient was not made a stroke alert at the time of arrival given her history of recent similar symptoms, a known diagnosis of a cerebral lesion, and an NIH stroke scale of 0.    Patient remained asymptomatic during her entire ER stay.  Once the patient's neuroimaging results were made available to me I reached out to the [facility] transfer center to see if I was able to speak to Dr. Gilberto Rivera with neurosurgery.  Patient has an upcoming appointment with Dr. Rivera.  I was informed by the transfer center that the patient studies were reviewed by Dr. Moreau, who felt that the patient's symptoms today were not critical or life-threatening and associated with the patient's meningioma, and that she could be discharged home and follow-up with Dr. Rivera as scheduled.  This was conveyed to the patient and her family who are in agreement with this plan, and I informed them to return to the ER at any time if they wish to be further evaluated for any continued or ongoing symptoms.    Amount and/or Complexity of Data Reviewed  External Data Reviewed: ECG.     Details: Twelve-lead EKG was interpreted by myself and this was noted to contribute directly to patient care.  Studies noted reveal normal sinus rhythm at 75 bpm, normal axis, normal R wave progression, no acute ischemic changes, no S1 Q3 T3 is identified.        Procedure  Procedures     Jeanmarie Ferrer,  DO  10/13/23 1837

## 2023-10-15 ENCOUNTER — TELEPHONE (OUTPATIENT)
Dept: PHARMACY | Facility: HOSPITAL | Age: 63
End: 2023-10-15
Payer: COMMERCIAL

## 2023-10-15 NOTE — PROGRESS NOTES
EDPD Note: Result Review    Reviewed Ms. Jolynn Pena 's chart regarding a positive urine culture (E. Coli) that was taken during their recent emergency room visit. The patient was not told about these results prior to leaving the emergency department. Patient reported asymptomatic (denied fever, chills, flank pain, dysuria). No antibiotics is indicated at this time. Therefore, patient was contacted and given proper education.     Susceptibility data from last 90 days.  Collected Specimen Info Organism Ampicillin Cefazolin Cefazolin (uncomplicated UTIs only) Ceftriaxone Ciprofloxacin Gentamicin Levofloxacin Nitrofurantoin Piperacillin/Tazobactam Trimethoprim/Sulfamethoxazole   10/10/23 Urine from Clean Catch/Voided Escherichia coli S S S  S S  S S S   09/11/23 Urine Escherichia coli S S  S S S S S S S       No further follow up needed from EDPD Team.     Mena Arriaga, PharmD  PGY-1 Pharmacy Resident  [phone]

## 2023-10-17 ENCOUNTER — OFFICE VISIT (OUTPATIENT)
Dept: NEUROSURGERY | Facility: CLINIC | Age: 63
End: 2023-10-17
Payer: COMMERCIAL

## 2023-10-17 VITALS
SYSTOLIC BLOOD PRESSURE: 100 MMHG | BODY MASS INDEX: 20.63 KG/M2 | HEART RATE: 90 BPM | WEIGHT: 124 LBS | TEMPERATURE: 96.4 F | DIASTOLIC BLOOD PRESSURE: 69 MMHG

## 2023-10-17 DIAGNOSIS — D32.9 MENINGIOMA (MULTI): ICD-10-CM

## 2023-10-17 DIAGNOSIS — R55 SYNCOPE, UNSPECIFIED SYNCOPE TYPE: ICD-10-CM

## 2023-10-17 DIAGNOSIS — G93.89 CEREBRAL MASS: Primary | ICD-10-CM

## 2023-10-17 PROCEDURE — 1036F TOBACCO NON-USER: CPT | Performed by: NURSE PRACTITIONER

## 2023-10-17 PROCEDURE — 99215 OFFICE O/P EST HI 40 MIN: CPT | Performed by: NURSE PRACTITIONER

## 2023-10-17 PROCEDURE — 99204 OFFICE O/P NEW MOD 45 MIN: CPT | Performed by: NURSE PRACTITIONER

## 2023-10-17 ASSESSMENT — PAIN SCALES - GENERAL: PAINLEVEL: 0-NO PAIN

## 2023-10-17 NOTE — PROGRESS NOTES
Jolynn Pena is a 63 y.o. year old female who presents for eval of right planus meningioma.    HPI  Jolynn Pena is a very nice 63 year old woman no history of illness who had syncopal event at work 10/10/23. She reports had episode of dry heaving, passed out, was taken to ED where CTH/CTA head was done- showed right anterior fossa mass. A few days later had witnessed transient episode of slurred speech with left facial droop and AMS, was again taken to ED where MRI brain was done. Family and patient denies any convulsions, or tremors, speech difficulty or confusion otherwise. No limb weakness, visual changes, or other focal neuro deficits. She feels otherwise healthy. Has been stressed, recent custodial, was taking hydroxyzine for anxiety.     Review of Systems  14/14 systems reviewed and negative other than what is listed in the history of present illness       Past Medical History:   Diagnosis Date    History of meningioma        Past Surgical History:   Procedure Laterality Date    CARPAL TUNNEL RELEASE Left        Social Connections: Not on file   Non smoker, no ETOH, no illicits  , retired 2 weeks ago      Current Outpatient Medications:     hydrOXYzine pamoate (Vistaril) 25 mg capsule, Take 1 capsule (25 mg) by mouth 4 times a day as needed for anxiety., Disp: , Rfl:     meloxicam (Mobic) 15 mg tablet, Take 1 tablet (15 mg) by mouth once daily., Disp: , Rfl:     oxybutynin XL (Ditropan-XL) 10 mg 24 hr tablet, Take 1 tablet (10 mg) by mouth once daily., Disp: 90 tablet, Rfl: 3        Objective   Vitals:    10/17/23 1459   BP: 100/69   Pulse: 90   Temp: 35.8 °C (96.4 °F)       Neurological Exam  General: Well developed, awake/alert/oriented x3, no distress, alert and cooperative    Skin: Color WNL, Warm and dry, no lesions, no rashes    ENMT: Mucous membranes moist    Head/Neck: Neck Supple, no apparent injury     I: smell Not tested   II: visual acuity  Not tested   II: visual fields Full to  confrontation   II: pupils Equal, round, reactive to light   III,VII: ptosis None   III,IV,VI: extraocular muscles  Full ROM   V: mastication Normal   V: facial light touch sensation  Normal   V,VII: corneal reflex  Present   VII: facial muscle function - upper  Normal   VII: facial muscle function - lower Normal   VIII: hearing Not tested   IX: soft palate elevation  Normal   IX,X: gag reflex Present   XI: trapezius strength  5/5   XI: sternocleidomastoid strength 5/5   XI: neck flexion strength  5/5   XII: tongue strength  Normal      Respiratory/Thorax: Regular and unlabored respirations. Good chest expansion, thorax symmetric    Cardiovascular: No pitting edema, no JVD    Motor Strength: 5/5 throughout    Muscle Bulk: Normal and symmetric in all extremities    Reflexes WNL, no hyperreflexia    Sensation: SILT    Gait WNL     Relevant Results    Dr. Rivera and I Personally viewed CTH/A 10/10/23, 10/13/23 showing calcified mass right anterior fossa and MRI brain 10/11/23 showing enhancing mass lesion with dural tail suggesting meningioma. No appreciable vasogenic edema, with mild mass effect.    Noted CT report C/A/P negative for malignancies.      Assessment/Plan   Diagnoses and all orders for this visit:  Cerebral mass  Meningioma (CMS/HCC)  Syncope, unspecified syncope type    Unclear relationship of syncope, temporary facial palsy, slurred speech to cerebral meningioma, no evidence for stroke/TIA based on imaging, presumed meningioma is incidental finding. Though seizures considered on differential.   - presented at [facility] multidisciplinary conference today recs-  - refer to neurology for syncope, TIA, seizure work up.   - baseline EEG  - observation with serial imaging 3 month MRI vs. resection  - return to clinic next week 10/24/23 for visit with Dr. Rivera to discuss treatment options for meningioma.    All questions were answered to the patient's and family's satisfaction.  Discussed seeking emergency care  for any seizure activity or AMS in interim.     This note was created in part after personal review of documents in EMR including recent labs and personally viewing available radiologic imaging. Total time spent in review, time spent with patient, presentation at conference, and formulating plan and documentation is 55 min.    Maria Esther Cuevas, BERNA-CNP           I called patient and discussed treatment recommendations based on consensus of skull base conference and discussion with Dr. Rivera.   Ordering EEG, referral to neurology for seizure work up, repeat MRI brain w/wo contrast in 6 months.  Patient agreeable but wants to discuss with her family.   Will keep follow up visit next week at this time.  Maria Esther Cuevas, BERNA-CNP

## 2023-10-19 DIAGNOSIS — R56.9 SEIZURE (MULTI): Primary | ICD-10-CM

## 2023-10-19 DIAGNOSIS — D32.0 CEREBRAL MENINGIOMA (MULTI): ICD-10-CM

## 2023-10-19 NOTE — PROGRESS NOTES
Jolynn Pena is a 63 y.o. year old female    HPI    Review of Systems       Past Medical History:   Diagnosis Date    History of meningioma        Past Surgical History:   Procedure Laterality Date    CARPAL TUNNEL RELEASE Left        Social Connections: Not on file           Current Outpatient Medications:     hydrOXYzine pamoate (Vistaril) 25 mg capsule, Take 1 capsule (25 mg) by mouth 4 times a day as needed for anxiety., Disp: , Rfl:     meloxicam (Mobic) 15 mg tablet, Take 1 tablet (15 mg) by mouth once daily., Disp: , Rfl:     oxybutynin XL (Ditropan-XL) 10 mg 24 hr tablet, Take 1 tablet (10 mg) by mouth once daily., Disp: 90 tablet, Rfl: 3        Objective   There were no vitals filed for this visit.    Neurological Exam      Relevant Results    ***    Problem List Items Addressed This Visit    None         Assessment/Plan   {Assess/PlanSmartLinks:87792}    All questions were answered to the patient's satisfaction.      This note was created in part after personal review of documents in EMR including recent labs and personally viewing available radiologic imaging. Total time spent in review, time spent with patient, and formulating plan and documentation is **.

## 2023-10-20 ENCOUNTER — APPOINTMENT (OUTPATIENT)
Dept: CARDIOLOGY | Facility: HOSPITAL | Age: 63
End: 2023-10-20
Payer: COMMERCIAL

## 2023-10-20 ENCOUNTER — APPOINTMENT (OUTPATIENT)
Dept: NEUROLOGY | Facility: HOSPITAL | Age: 63
End: 2023-10-20
Payer: COMMERCIAL

## 2023-10-23 ENCOUNTER — HOSPITAL ENCOUNTER (OUTPATIENT)
Dept: NEUROLOGY | Facility: HOSPITAL | Age: 63
Discharge: HOME | End: 2023-10-23
Payer: COMMERCIAL

## 2023-10-23 DIAGNOSIS — D32.0 CEREBRAL MENINGIOMA (MULTI): ICD-10-CM

## 2023-10-23 DIAGNOSIS — R56.9 SEIZURE (MULTI): ICD-10-CM

## 2023-10-23 PROCEDURE — 95812 EEG 41-60 MINUTES: CPT | Performed by: PSYCHIATRY & NEUROLOGY

## 2023-10-23 PROCEDURE — 95812 EEG 41-60 MINUTES: CPT

## 2023-10-24 ENCOUNTER — APPOINTMENT (OUTPATIENT)
Dept: NEUROSURGERY | Facility: CLINIC | Age: 63
End: 2023-10-24
Payer: COMMERCIAL

## 2023-10-27 LAB — HOLD SPECIMEN: NORMAL

## 2023-10-30 ENCOUNTER — TELEPHONE (OUTPATIENT)
Dept: NEUROSURGERY | Facility: HOSPITAL | Age: 63
End: 2023-10-30
Payer: COMMERCIAL

## 2023-10-30 NOTE — TELEPHONE ENCOUNTER
Left message to have MRI done before seeing Dr. Rivera in April 2024. Seeing Neurology in February is not a problem. Left my office number for any other questions or concerns.

## 2023-12-26 ENCOUNTER — APPOINTMENT (OUTPATIENT)
Dept: NEUROSURGERY | Facility: CLINIC | Age: 63
End: 2023-12-26
Payer: COMMERCIAL

## 2024-01-30 ENCOUNTER — HOSPITAL ENCOUNTER (OUTPATIENT)
Dept: RADIOLOGY | Facility: HOSPITAL | Age: 64
Discharge: HOME | End: 2024-01-30
Payer: COMMERCIAL

## 2024-01-30 DIAGNOSIS — R55 SYNCOPE, UNSPECIFIED SYNCOPE TYPE: ICD-10-CM

## 2024-01-30 DIAGNOSIS — D32.9 MENINGIOMA (MULTI): ICD-10-CM

## 2024-01-30 DIAGNOSIS — G93.89 CEREBRAL MASS: ICD-10-CM

## 2024-01-30 PROCEDURE — 2500000004 HC RX 250 GENERAL PHARMACY W/ HCPCS (ALT 636 FOR OP/ED): Performed by: NURSE PRACTITIONER

## 2024-01-30 PROCEDURE — A9575 INJ GADOTERATE MEGLUMI 0.1ML: HCPCS | Performed by: NURSE PRACTITIONER

## 2024-01-30 PROCEDURE — 70553 MRI BRAIN STEM W/O & W/DYE: CPT

## 2024-01-30 RX ORDER — GADOTERATE MEGLUMINE 376.9 MG/ML
12 INJECTION INTRAVENOUS
Status: COMPLETED | OUTPATIENT
Start: 2024-01-30 | End: 2024-01-30

## 2024-01-30 RX ADMIN — GADOTERATE MEGLUMINE 12 ML: 376.9 INJECTION INTRAVENOUS at 18:15

## 2024-02-08 ENCOUNTER — APPOINTMENT (OUTPATIENT)
Dept: RADIOLOGY | Facility: HOSPITAL | Age: 64
End: 2024-02-08
Payer: COMMERCIAL

## 2024-02-26 ENCOUNTER — OFFICE VISIT (OUTPATIENT)
Dept: NEUROLOGY | Facility: CLINIC | Age: 64
End: 2024-02-26
Payer: COMMERCIAL

## 2024-02-26 DIAGNOSIS — R20.2 NUMBNESS AND TINGLING: Primary | ICD-10-CM

## 2024-02-26 DIAGNOSIS — R20.0 NUMBNESS AND TINGLING: Primary | ICD-10-CM

## 2024-02-26 DIAGNOSIS — D32.9 MENINGIOMA (MULTI): ICD-10-CM

## 2024-02-26 DIAGNOSIS — R55 SYNCOPE, UNSPECIFIED SYNCOPE TYPE: ICD-10-CM

## 2024-02-26 DIAGNOSIS — G93.89 CEREBRAL MASS: ICD-10-CM

## 2024-02-26 PROCEDURE — 99205 OFFICE O/P NEW HI 60 MIN: CPT | Performed by: STUDENT IN AN ORGANIZED HEALTH CARE EDUCATION/TRAINING PROGRAM

## 2024-02-26 PROCEDURE — 99215 OFFICE O/P EST HI 40 MIN: CPT | Mod: GC | Performed by: STUDENT IN AN ORGANIZED HEALTH CARE EDUCATION/TRAINING PROGRAM

## 2024-02-26 PROCEDURE — 1036F TOBACCO NON-USER: CPT | Performed by: STUDENT IN AN ORGANIZED HEALTH CARE EDUCATION/TRAINING PROGRAM

## 2024-02-26 NOTE — PROGRESS NOTES
"Date of Service: 2/26/2024  Patient: Jolynn Pena  MRN: [id number]  Referring Provider: Maria Esther Cuevas, APR*  PCP: Fabrice Santillan MD    History of Present Illness:   Ms. Pena is a 63 y.o. right handed female who presents to neurology clinic for evaluation of syncope vs. seizure.     Her symptoms began in Summer of 2023 when she was feeling nauseous and getting daily dry heaving with loss of appetite. Around Sep 2023 she was getting abdominal pain and was evaluated at the ED and was told she has \"bruised ribs\". 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     October 10th 2023 She had an episode of nausea, dry heaving, and room spinning lasting minutes in the bathroom at the [employer]. She was also very anxious. Per ED report: \"It was reported by the patient's coworkers that she started breathing fast, could not stand up, could not speak.\" She then lost consciousness and passed out onto the table (per her friends who were witness). After this she was not responding to them. She does not remember the other events there, she only remembers being in the emergency room after that (details of this are unclear). In regards to the spells described, she does not get any aura, visual, or unusual sensations. CT and CTA was then done showing a large right hemispheric mass, which an MRI then confirmed to be a meningioma.     Two days later October 13th 2023 the left side of her face started drooping, she felt a numbness/tingling in her face. She was told that her speech was off and was not making sense. She doesn't remember any other part of her body involved, however ED report mentions \"tingling in her left foot\", however the patient does not recall this. CT head was stable.     Since January (about 1 month) She has an uncomfortable tingling/itching in her left thumb, index finger, and hand coming up into her left arm. It comes in episodes " lasting minutes up to 10 mins. The longest may have been 30 mins, occurring daily. She did have left CTR in 2004 for shooting pains in her left arm along with numbness in the left hand which went away after surgery.     Her medical history is limited and otherwise unremarkable.     Review of Systems:  The systems were reviewed with pertinent positives and negatives documented in the HPI.      Past Medical & Surgical History  Past Medical History:   Diagnosis Date    History of meningioma      Past Surgical History:   Procedure Laterality Date    CARPAL TUNNEL RELEASE Left        Social History:   Social History     Tobacco Use    Smoking status: Never    Smokeless tobacco: Never   Substance Use Topics    Alcohol use: Yes     Comment: occasional        Family History:   @FHX@     Medications:     Current Outpatient Medications:     hydrOXYzine pamoate (Vistaril) 25 mg capsule, Take 1 capsule (25 mg) by mouth 4 times a day as needed for anxiety., Disp: , Rfl:     meloxicam (Mobic) 15 mg tablet, Take 1 tablet (15 mg) by mouth once daily., Disp: , Rfl:     oxybutynin XL (Ditropan-XL) 10 mg 24 hr tablet, Take 1 tablet (10 mg) by mouth once daily., Disp: 90 tablet, Rfl: 3     General Physical Exam:  There were no vitals taken for this visit.     She looks well and is not in any acute distress. Breathing comfortably on room air.     Neurological Exam:   Mental status reveals her to be alert and oriented to person, place, and date. Speech is intact to conversation. Fund of knowledge is normal.     Cranial nerves:  CN 2   Visual fields full to confrontation.   CN 3, 4, 6   Pupils round, 4 mm in diameter, equally reactive to light. Lids symmetric; no ptosis. EOMs normal alignment, full range.   No nystagmus.   CN 5   Facial sensation intact bilaterally.   CN 7   Normal and symmetric facial strength. Nasolabial folds symmetric.   CN 8   Hearing intact to conversation.   CN 9   Palate elevates symmetrically.   CN 11   Normal  strength of shoulder shrug and neck turning.   CN 12   Tongue midline, with normal bulk and strength; no fasciculations.      Motor:  Muscle bulk: Normal throughout.  Muscle tone: Normal in both upper and lower extremities.  Movements: No fasciculations, tremors or other abnormal movement.    Neck Flexion 5  Neck Extension 5    R L   5 5 Shoulder abduction  5 5 Elbow flexion  5 5 Elbow extension  5 5 Finger flexion  5 5 Finger extension  5 5 Finger abduction  5 5 Hip flexion  5 5 Hip extension  5 5 Hip abduction (with hip flexed)  5 5 Knee flexion  5 5 Knee extension  5 5 Ankle dorsiflexion  5 5 Ankle plantarflexion  5 5 Big toe extension  5 5 Toe flexion     Reflexes                         R     L  Triceps          2      2  Biceps           2      2  Brachiorad    2      2  Salas       neg  neg  Patellar         3      3   Achilles         2      2    Babinski: toes downgoing to plantar stimulation. No clonus or other pathologic reflexes present.      Sensory:   Light Touch: normal  Pin: normal   Position: normal  Vibration: normal  Temperature: Normal     Coordination:  In both upper extremities, finger-nose-finger was intact without dysmetria or overshoot.   In both lower extremities, heel-to-shin was intact. KEELEY were intact in both upper and lower extremities.      Gait:  Station was stable with a normal base. Gait was stable with a normal arm swing and speed. No ataxia, shuffling, steppage or waddling was present. No circumduction was present. Tandem gait was intact. No Romberg sign was present.    Results:  CBC:   Lab Results   Component Value Date    WBC 6.3 10/13/2023    HGB 13.5 10/13/2023    HCT 42.2 10/13/2023     10/13/2023     BMP:   Lab Results   Component Value Date     10/13/2023    K 4.2 10/13/2023     10/13/2023    CO2 27 10/13/2023    BUN 21 10/13/2023    CREATININE 0.71 10/13/2023    CALCIUM 9.4 10/13/2023    MG 1.95 10/10/2023     LFT:   Lab Results   Component Value Date     ALKPHOS 69 10/10/2023    BILITOT 0.3 10/10/2023    PROT 6.6 10/10/2023    ALBUMIN 4.2 10/10/2023    ALT 16 10/10/2023    AST 18 10/10/2023       Imaging:    We personally reviewed the MRI brain w/wo 1/30/24 - reveals a homogeneously enhancing mass arising from the adjacent dura within the right anterior cranial fossa. There is displacement of the orbital frontal and rostral frontal lobe with minimal mass effect on surrounding tissue and no vasogenic edema. In addition there is no evidence of ischemic infarction.    CTA of the head and neck was also reviewed and reveals patent vasculature without focal lesions or significant stenosis.      Routine EEG 10/23/24 is normal.       Impression:  Jolynn Pena is a 63 y.o. who presents with several unusual paroxysmal events.  Her symptoms began in the context of several months of nausea and dry heaving, along with anxiety about her work.  She then has a unresponsive event which she has amnesia of and witnesses report she could not speak.  After this first event she is found to have a large right frontal meningioma without significant vasogenic edema.  She then has a second event, now left facial droop and face numbness and tingling, and difficulty speaking.  Although not recalled by the patient, ED documentation reported left foot tingling as well.  Her exam today is normal.  These 2 spells have some unique features however both share disruption in speech output and poor recollection of events from the patient.  Is unclear if these represent epileptic events, or symptoms of anxiety, unlikely cardiac.  Another consideration is a cerebral vascular event, however she lacks risk factors and her vessels are widely patent.  Additionally the initial attack would be very atypical for a vascular event.    Additionally, she now has frequent, intermittent daily tingling in the left hand and arm.  Tingling is maximal in the thumb and index finger, suggesting a peripheral etiology  shooting pains in the left arm suggest a possible radiculopathy.  It is very unlikely but possible that these represent seizure.  Will pursue an extended EEG which will certainly capture sensory symptoms in the left arm, and perform an EMG of the left upper extremity to assess for peripheral etiologies keeping in mind that she did have prior left carpal tunnel release surgery.    Plan:  -1 hour EEG  -EMG left upper extremity  -Follow-up after testing     Reviewed and approved by JOSÉ MIGUEL CONCEPCION on 2/26/24 at 2:58 PM.    I saw and evaluated the patient. I personally obtained the key and critical portions of the history and physical exam or was physically present for key and critical portions performed by the resident/fellow. I reviewed the resident/fellow's documentation and discussed the patient with the resident/fellow. I agree with the resident/fellow's medical decision making as documented in the note.    The patient had three unusual paroxysmal events:  Episode of loss of consciousness following months of dry heaving, vomiting and generalized anxiety about retiring. Immediately prior to this episode she reports vomiting and feeling unwell. She then reportedly lost consciousness. No witnessed convulsions. She has amnesia regarding this event.  While with a friend she reportedly developed left facial drooping and face numbness. This quickly resolved.   Currently, she will get paresthesias multiple times an hours primarily in her left thumb, index and middle finger. They sometimes go up into the distal forearm as well. She also reports left proximal pain.    During work up for the loss of consciousness episode, she was found to have a right frontal meningioma without significant vasogenic edema or mass effect. She denies any prior history of seizures.    The etiology of the first two events is unclear. They could represent an epilepetic event; however, she was also very anxious with vomiting at the time and  symptoms of anxiety such as a panic attack is also possible. Routine EEG was normal and we will pursue extended EEG testing. Regarding the left finger paresthesias these most likely from a peripheral nerve etiology. EMG is ordered. It is very unlikely that these are epileptic; however presumably the prolonged EEG will capture these episodes as well.    Tim Perkins MD     I personally spent 60 minutes on the day of the visit completing the review of the medical record and outside records, obtaining history and performing an appropriate physical exam, patient care, counseling and education, placing orders, independently reviewing results, communicating with the patient and other providers, coordinating care and performing appropriate clinical documentation.

## 2024-02-26 NOTE — PATIENT INSTRUCTIONS
Please schedule your EMG with Dr. Perkins and EEG at [facility] to better assess your symptoms.     We will follow up after testing.

## 2024-03-11 ENCOUNTER — HOSPITAL ENCOUNTER (OUTPATIENT)
Dept: NEUROLOGY | Facility: CLINIC | Age: 64
Discharge: HOME | End: 2024-03-11
Payer: COMMERCIAL

## 2024-03-11 DIAGNOSIS — R20.2 NUMBNESS AND TINGLING: ICD-10-CM

## 2024-03-11 DIAGNOSIS — R20.0 NUMBNESS AND TINGLING: ICD-10-CM

## 2024-03-11 PROCEDURE — 95886 MUSC TEST DONE W/N TEST COMP: CPT | Performed by: STUDENT IN AN ORGANIZED HEALTH CARE EDUCATION/TRAINING PROGRAM

## 2024-03-11 PROCEDURE — 95886 MUSC TEST DONE W/N TEST COMP: CPT | Mod: GC | Performed by: STUDENT IN AN ORGANIZED HEALTH CARE EDUCATION/TRAINING PROGRAM

## 2024-03-11 PROCEDURE — 95911 NRV CNDJ TEST 9-10 STUDIES: CPT | Performed by: STUDENT IN AN ORGANIZED HEALTH CARE EDUCATION/TRAINING PROGRAM

## 2024-03-11 PROCEDURE — 95911 NRV CNDJ TEST 9-10 STUDIES: CPT | Mod: GC | Performed by: STUDENT IN AN ORGANIZED HEALTH CARE EDUCATION/TRAINING PROGRAM

## 2024-03-14 ENCOUNTER — APPOINTMENT (OUTPATIENT)
Dept: NEUROLOGY | Facility: HOSPITAL | Age: 64
End: 2024-03-14
Payer: COMMERCIAL

## 2024-03-28 ENCOUNTER — HOSPITAL ENCOUNTER (OUTPATIENT)
Dept: NEUROLOGY | Facility: HOSPITAL | Age: 64
Discharge: HOME | End: 2024-03-28
Payer: COMMERCIAL

## 2024-03-28 DIAGNOSIS — R20.0 NUMBNESS AND TINGLING: ICD-10-CM

## 2024-03-28 DIAGNOSIS — R20.2 NUMBNESS AND TINGLING: ICD-10-CM

## 2024-03-28 PROCEDURE — 95819 EEG AWAKE AND ASLEEP: CPT

## 2024-03-28 PROCEDURE — 95819 EEG AWAKE AND ASLEEP: CPT | Performed by: STUDENT IN AN ORGANIZED HEALTH CARE EDUCATION/TRAINING PROGRAM

## 2024-04-11 ENCOUNTER — LAB (OUTPATIENT)
Dept: LAB | Facility: LAB | Age: 64
End: 2024-04-11
Payer: COMMERCIAL

## 2024-04-11 DIAGNOSIS — D32.9 MENINGIOMA (MULTI): ICD-10-CM

## 2024-04-11 DIAGNOSIS — G93.89 CEREBRAL MASS: ICD-10-CM

## 2024-04-11 DIAGNOSIS — R55 SYNCOPE, UNSPECIFIED SYNCOPE TYPE: ICD-10-CM

## 2024-04-11 LAB
CREAT SERPL-MCNC: 0.9 MG/DL (ref 0.5–1.05)
EGFRCR SERPLBLD CKD-EPI 2021: 73 ML/MIN/1.73M*2

## 2024-04-11 PROCEDURE — 82565 ASSAY OF CREATININE: CPT

## 2024-04-11 PROCEDURE — 36415 COLL VENOUS BLD VENIPUNCTURE: CPT

## 2024-04-15 DIAGNOSIS — D32.9 MENINGIOMA (MULTI): ICD-10-CM

## 2024-04-16 ENCOUNTER — APPOINTMENT (OUTPATIENT)
Dept: NEUROSURGERY | Facility: CLINIC | Age: 64
End: 2024-04-16
Payer: COMMERCIAL

## 2024-04-23 ENCOUNTER — OFFICE VISIT (OUTPATIENT)
Dept: PRIMARY CARE | Facility: CLINIC | Age: 64
End: 2024-04-23
Payer: COMMERCIAL

## 2024-04-23 VITALS
SYSTOLIC BLOOD PRESSURE: 118 MMHG | HEART RATE: 86 BPM | OXYGEN SATURATION: 96 % | DIASTOLIC BLOOD PRESSURE: 62 MMHG | WEIGHT: 137.8 LBS | HEIGHT: 65 IN | BODY MASS INDEX: 22.96 KG/M2

## 2024-04-23 DIAGNOSIS — R56.9 SEIZURE (MULTI): ICD-10-CM

## 2024-04-23 DIAGNOSIS — F51.01 PRIMARY INSOMNIA: Primary | ICD-10-CM

## 2024-04-23 PROCEDURE — 1036F TOBACCO NON-USER: CPT | Performed by: FAMILY MEDICINE

## 2024-04-23 PROCEDURE — 99213 OFFICE O/P EST LOW 20 MIN: CPT | Performed by: FAMILY MEDICINE

## 2024-04-23 RX ORDER — TRAZODONE HYDROCHLORIDE 50 MG/1
TABLET ORAL
Qty: 60 TABLET | Refills: 5 | Status: SHIPPED | OUTPATIENT
Start: 2024-04-23

## 2024-04-23 NOTE — PROGRESS NOTES
"Subjective   Patient ID: Jolynn Pena is a 63 y.o. female who presents for Medication Visit.    HPI has follow-up with neurosurgery regarding brain, here today with HPI of \"I am so emotional \", cannot get brain to shut down in the evening and A lot of crying, review shows [employer] SL-hrs, karmen after 4 hrs, crying 14/14, francis+, seth=, H-0S-0. TR++, conc-0  No panic,no ocd  Review of Systems  As per HPI  Objective   /62   Pulse 86   Ht 1.651 m (5' 5\")   Wt 62.5 kg (137 lb 12.8 oz)   SpO2 96%   BMI 22.93 kg/m²     Physical Exam  Tearful during exam, rapid anxious productive speech.  Assessment/Plan   Problem List Items Addressed This Visit             ICD-10-CM    Seizure (Multi) R56.9     Other Visit Diagnoses         Codes    Primary insomnia    -  Primary F51.01    Relevant Medications    traZODone (Desyrel) 50 mg tablet    Other Relevant Orders    Follow Up In Primary Care - Established          Reviewed options of SSRI in combination with other drugs.  Will begin with trazodone 50 at at bedtime for the first week increasing to 100 mg thereafter.  Will recheck in 6 weeks and continue titration if needed or consider combination therapy.     "

## 2024-06-04 ENCOUNTER — APPOINTMENT (OUTPATIENT)
Dept: PRIMARY CARE | Facility: CLINIC | Age: 64
End: 2024-06-04
Payer: COMMERCIAL

## 2024-07-30 ENCOUNTER — HOSPITAL ENCOUNTER (OUTPATIENT)
Dept: RADIOLOGY | Facility: HOSPITAL | Age: 64
Discharge: HOME | End: 2024-07-30
Payer: COMMERCIAL

## 2024-07-30 DIAGNOSIS — D32.9 MENINGIOMA (MULTI): ICD-10-CM

## 2024-07-30 PROCEDURE — 2500000004 HC RX 250 GENERAL PHARMACY W/ HCPCS (ALT 636 FOR OP/ED)

## 2024-07-30 PROCEDURE — A9575 INJ GADOTERATE MEGLUMI 0.1ML: HCPCS

## 2024-07-30 PROCEDURE — 70553 MRI BRAIN STEM W/O & W/DYE: CPT

## 2024-07-30 PROCEDURE — 70553 MRI BRAIN STEM W/O & W/DYE: CPT | Performed by: RADIOLOGY

## 2024-07-30 RX ORDER — GADOTERATE MEGLUMINE 376.9 MG/ML
12 INJECTION INTRAVENOUS
Status: COMPLETED | OUTPATIENT
Start: 2024-07-30 | End: 2024-07-30

## 2024-08-01 NOTE — PROGRESS NOTES
Jolynn Pena is a very nice 64 year old woman last seen in office on 10/17/23 by Maria Esther GRANADOS for syncopal event at work 10/10/23. She reports had episode of dry heaving, passed out, was taken to ED where CTH/CTA head was done- showed right anterior fossa mass noted as meningioma. A few days later had witnessed transient episode of slurred speech with left facial droop and AMS, was again taken to ED where MRI brain was done and was found to have a right frontal meningioma.     Differential diagnosis for her event was thought to be either seizure or related to anxiety or may vascular, but no vascular risk factors. EMG and EEG and neurology work-up was essentially normal.      She has not had any further syncopal episodes; she has some increase in headaches over the last couple years, but these are still not severe.     I personally reviewed MRI brain w/wo done 7/30/24 and compared to prior MRI done on 1/30/24 and CT head without done on 10/20/23. This shows stable right planum sphenoidale meningioma measuring up to 2.7 cm in diameter. There is no surrounding vasogenic edema.    I went over treatment options including continued observation, surgery, and radiation. She would like continue observation and I think this is very reasonable. We will get repeat MRI brain w/wo in 1 year.     Gilberto Rivera MD    Prep Time On Date of the Patient Encounter:    10 Minutes  Time Directly with Patient/Family/Caregiver:    15 Minutes  Additional Time Spent on Patient Care Activities:   0 Minutes  Documentation Time:       5 Minutes  Other Time Spent:       0 Minutes    Details of Other Time Spent:      Total Time on Date of Patient Encounter:    30 Minutes

## 2024-08-06 ENCOUNTER — OFFICE VISIT (OUTPATIENT)
Dept: NEUROSURGERY | Facility: CLINIC | Age: 64
End: 2024-08-06
Payer: COMMERCIAL

## 2024-08-06 VITALS
TEMPERATURE: 97.5 F | SYSTOLIC BLOOD PRESSURE: 122 MMHG | DIASTOLIC BLOOD PRESSURE: 85 MMHG | BODY MASS INDEX: 21.66 KG/M2 | HEART RATE: 93 BPM | WEIGHT: 130 LBS | HEIGHT: 65 IN

## 2024-08-06 DIAGNOSIS — D32.0 CEREBRAL MENINGIOMA (MULTI): Primary | ICD-10-CM

## 2024-08-06 DIAGNOSIS — G93.89 CEREBRAL MASS: ICD-10-CM

## 2024-08-06 PROCEDURE — 3008F BODY MASS INDEX DOCD: CPT | Performed by: NEUROLOGICAL SURGERY

## 2024-08-06 PROCEDURE — 99214 OFFICE O/P EST MOD 30 MIN: CPT | Performed by: NEUROLOGICAL SURGERY

## 2024-08-06 ASSESSMENT — PAIN SCALES - GENERAL: PAINLEVEL: 0-NO PAIN

## 2024-10-22 DIAGNOSIS — F51.01 PRIMARY INSOMNIA: ICD-10-CM

## 2024-10-22 RX ORDER — TRAZODONE HYDROCHLORIDE 50 MG/1
TABLET ORAL
Qty: 180 TABLET | Refills: 3 | Status: SHIPPED | OUTPATIENT
Start: 2024-10-22

## 2024-12-29 DIAGNOSIS — R32 URINARY INCONTINENCE, UNSPECIFIED TYPE: ICD-10-CM

## 2025-01-06 RX ORDER — OXYBUTYNIN CHLORIDE 10 MG/1
10 TABLET, EXTENDED RELEASE ORAL DAILY
Qty: 90 TABLET | Refills: 1 | Status: SHIPPED | OUTPATIENT
Start: 2025-01-06

## 2025-07-29 ENCOUNTER — HOSPITAL ENCOUNTER (OUTPATIENT)
Dept: RADIOLOGY | Facility: HOSPITAL | Age: 65
Discharge: HOME | End: 2025-07-29
Payer: COMMERCIAL

## 2025-07-29 DIAGNOSIS — G93.89 CEREBRAL MASS: ICD-10-CM

## 2025-07-29 PROCEDURE — 2550000001 HC RX 255 CONTRASTS: Performed by: NEUROLOGICAL SURGERY

## 2025-07-29 PROCEDURE — A9575 INJ GADOTERATE MEGLUMI 0.1ML: HCPCS | Performed by: NEUROLOGICAL SURGERY

## 2025-07-29 PROCEDURE — 70553 MRI BRAIN STEM W/O & W/DYE: CPT | Performed by: RADIOLOGY

## 2025-07-29 PROCEDURE — 70553 MRI BRAIN STEM W/O & W/DYE: CPT

## 2025-07-29 RX ORDER — GADOTERATE MEGLUMINE 376.9 MG/ML
12 INJECTION INTRAVENOUS
Status: COMPLETED | OUTPATIENT
Start: 2025-07-29 | End: 2025-07-29

## 2025-07-29 RX ADMIN — GADOTERATE MEGLUMINE 20 ML: 376.9 INJECTION INTRAVENOUS at 18:17
